# Patient Record
Sex: FEMALE | Race: WHITE | Employment: OTHER | URBAN - NONMETROPOLITAN AREA
[De-identification: names, ages, dates, MRNs, and addresses within clinical notes are randomized per-mention and may not be internally consistent; named-entity substitution may affect disease eponyms.]

---

## 2018-01-24 ENCOUNTER — DOCUMENTATION ONLY (OUTPATIENT)
Dept: FAMILY MEDICINE | Facility: OTHER | Age: 83
End: 2018-01-24

## 2018-01-24 RX ORDER — CARVEDILOL 25 MG/1
12.5 TABLET ORAL 2 TIMES DAILY
COMMUNITY

## 2018-01-24 RX ORDER — ASPIRIN 81 MG/1
81 TABLET ORAL
COMMUNITY

## 2018-01-24 RX ORDER — DILTIAZEM HYDROCHLORIDE 120 MG/1
120 CAPSULE, EXTENDED RELEASE ORAL DAILY
COMMUNITY

## 2018-01-24 RX ORDER — SERTRALINE HYDROCHLORIDE 25 MG/1
50 TABLET, FILM COATED ORAL DAILY
COMMUNITY

## 2018-01-24 RX ORDER — MECLIZINE HYDROCHLORIDE 25 MG/1
25 TABLET ORAL 3 TIMES DAILY PRN
COMMUNITY

## 2018-01-24 RX ORDER — ERGOCALCIFEROL 1.25 MG/1
50000 CAPSULE, LIQUID FILLED ORAL
COMMUNITY

## 2018-01-24 RX ORDER — CLOPIDOGREL BISULFATE 75 MG/1
75 TABLET ORAL DAILY
COMMUNITY

## 2018-01-24 RX ORDER — LISINOPRIL 10 MG/1
10 TABLET ORAL DAILY
COMMUNITY

## 2018-01-24 RX ORDER — ZINC SULFATE 50(220)MG
220 CAPSULE ORAL DAILY
COMMUNITY

## 2018-08-15 ENCOUNTER — NURSING HOME VISIT (OUTPATIENT)
Dept: GERIATRICS | Facility: OTHER | Age: 83
End: 2018-08-15
Attending: NURSE PRACTITIONER
Payer: MEDICARE

## 2018-08-15 DIAGNOSIS — I48.19 PERSISTENT ATRIAL FIBRILLATION (H): Primary | ICD-10-CM

## 2018-08-15 DIAGNOSIS — R06.02 SHORTNESS OF BREATH: ICD-10-CM

## 2018-08-15 PROCEDURE — 99310 SBSQ NF CARE HIGH MDM 45: CPT | Performed by: NURSE PRACTITIONER

## 2018-08-15 NOTE — MR AVS SNAPSHOT
"              After Visit Summary   8/15/2018    Janneth Liu    MRN: [id number]           Patient Information     Date Of Birth          1921        Visit Information        Provider Department      8/15/2018 3:45 AM Opal Carson APRN CNP [facility]        Today's Diagnoses     Persistent atrial fibrillation (H)    -  1    Shortness of breath           Follow-ups after your visit        Who to contact     If you have questions or need follow up information about today's clinic visit or your schedule please contact [facility] directly at [phone].  Normal or non-critical lab and imaging results will be communicated to you by BO.LThart, letter or phone within 4 business days after the clinic has received the results. If you do not hear from us within 7 days, please contact the clinic through BO.LThart or phone. If you have a critical or abnormal lab result, we will notify you by phone as soon as possible.  Submit refill requests through [employer] or call your pharmacy and they will forward the refill request to us. Please allow 3 business days for your refill to be completed.          Additional Information About Your Visit        MyChart Information     [employer] lets you send messages to your doctor, view your test results, renew your prescriptions, schedule appointments and more. To sign up, go to [email or web address] . Click on \"Log in\" on the left side of the screen, which will take you to the Welcome page. Then click on \"Sign up Now\" on the right side of the page.     You will be asked to enter the access code listed below, as well as some personal information. Please follow the directions to create your username and password.     Your access code is: [email or web address]  Expires: 2018 10:57 AM     Your access code will  in 90 days. If you need help or a new code, please call your [place] clinic or [phone].        Care EveryWhere ID     This is your " Care EveryWhere ID. This could be used by other organizations to access your [facility] medical records  [id number]         Blood Pressure from Last 3 Encounters:   No data found for BP    Weight from Last 3 Encounters:   No data found for Wt              Today, you had the following     No orders found for display       Primary Care Provider    None Specified       No primary provider on file.        Equal Access to Services     [facility] : Hadii aad ku hadasho Soomaali, waaxda luqadaha, qaybta kaalmada ademarcianoda, anup mon marilinalberto presley lonraina titus . So [facility] [phone].    ATENCIÓN: Si habla español, tiene a parry disposición servicios gratuitos de asistencia lingüística. Llame al [phone].    We comply with applicable federal civil rights laws and [place] laws. We do not discriminate on the basis of race, color, national origin, age, disability, sex, sexual orientation, or gender identity.            Thank you!     Thank you for choosing [facility] AND [facility]  for your care. Our goal is always to provide you with excellent care. Hearing back from our patients is one way we can continue to improve our services. Please take a few minutes to complete the written survey that you may receive in the mail after your visit with us. Thank you!             Your Updated Medication List - Protect others around you: Learn how to safely use, store and throw away your medicines at www.disposemymeds.org.          This list is accurate as of 8/15/18 11:59 PM.  Always use your most recent med list.                   Brand Name Dispense Instructions for use Diagnosis    aspirin 81 MG EC tablet      Take 81 mg by mouth        carvedilol 25 MG tablet    COREG     Take 12.5 mg by mouth 2 times daily        clopidogrel 75 MG tablet    PLAVIX     Take 75 mg by mouth daily        diltiazem 120 MG Cp12 12 hr SR capsule    CARDIZEM SR     Take 120 mg by mouth daily        lisinopril 10 MG tablet    PRINIVIL/ZESTRIL      Take 10 mg by mouth daily        meclizine 25 MG tablet    ANTIVERT     Take 25 mg by mouth 3 times daily as needed        sertraline 25 MG tablet    ZOLOFT     Take 50 mg by mouth daily        vitamin D 97749 UNIT capsule    ERGOCALCIFEROL     Take 50,000 Units by mouth every 28 days        zinc sulfate 220 (50 Zn) MG capsule    ZINCATE     Take 220 mg by mouth daily

## 2018-08-16 NOTE — PROGRESS NOTES
[facility]  Acute Care Visit    Patient Name: Janneth Liu   : 1921  MRN: [id number]    Place of Service: [facility]  DOS: 8/15/2018    CC: short of breath, tachycardia, chest pain    HPI:  Janneth Liu is a 89 year old female with PMH of multiple chronic medical concerns, who is seen today regarding nursing staff reporting she was going to therapy and with little exertion was starting to feel short of breath and chest pain. Oxygen saturation was in the 80's. Put on oxygen up to 3 liters and now up to 95%. Pt is new admit to [facility]. She had previously been living at home with her son and was brought to ED  in [place] for increased weakness, fast breathing, unable to stand. Pt currently being treated with Augmentin for UTI but also question pneumonia.   At the time of my assessment, nursing staff had given her a nitroglycerin x 1 and chest pain gone. She is complaining of left side pain and son reports she fell about a month ago and has been complaining of pain there ever since.   Wants to lie down. Refuses to eat or drink.   Hx of being on coumadin and this was stopped due to falls. Hx of intracranial hemorrhage as well.   Pt is clear she does not want hospital and is ready to die. Talked with son about goals of care and he understands--hospice consult and comfort measures are what is important to her and he would also like to take her home for her last days as she wished.         Multidisciplinary notes, laboratory values, medications, vital signs, weight and orders arereviewed from nursing home records. I have reviewed the patient s medical history and updated the computerized patient record.     PMH:  No past medical history on file.    Medications:  Current Outpatient Prescriptions   Medication     aspirin EC 81 MG EC tablet     carvedilol (COREG) 25 MG tablet     clopidogrel (PLAVIX) 75 MG tablet     diltiazem (CARDIZEM SR) 120 MG CP12 12 hr SR capsule     lisinopril  "(PRINIVIL/ZESTRIL) 10 MG tablet     meclizine (ANTIVERT) 25 MG tablet     sertraline (ZOLOFT) 25 MG tablet     vitamin D (ERGOCALCIFEROL) 89991 UNIT capsule     zinc sulfate (ZINCATE) 220 (50 ZN) MG capsule     No current facility-administered medications for this visit.        Medication reconciliation complete between Epic record and [facility].     Allergies:  Allergies   Allergen Reactions     Amiodarone Unknown     Brimonidine Unknown     Erythromycin Unknown     Latanoprost Unknown     Simvastatin Unknown     Timolol Unknown       Review of Systems:  \"I feel like I got run over by a truck.\"   Reports left sided rib cage pain-worse with deep breath  Denies chest pain  Keeps asking why she is here. Doesn't know where she is supposed to be.   Denies feeling short of breath at this time-better with oxygen on      Vital Signs:  Temp 96.7   Pulse 118   Respirations 20   /82  Oxygen Sats 95% on 3 Liters nasal cannula     Physical Exam:     Pleasant and alert without distress. Affect confused/forgetful. Clear speech but repeats some.   Skin color pink. Mucous membranes dry.   Lungs very dim bases-almost absent breath sounds. Upper lobes with clear air exchange. Mild exp wheezing.   Cardiovascular irregular, tachycardia. No murmur noted.  Abdomen soft and without masses, tenderness and organomegaly.   Extremities without edema.         Assessment/Plan:  (I48.1) Persistent atrial fibrillation (H)  (primary encounter diagnosis)  Comment: rapid ventricular rate  Plan: Pt has not been wanting to eat or drink, likely dehydrated and causing tachycardia. Currently taking coreg BID. Discussed with son slow progression or decline in patients health and he agrees to hospice consult. His goal was to take her home with hospice.     (R06.02) Shortness of breath  Comment: acute  Plan: Likely short of breath due to tachycardia from rapid afib response due to dehydration.   Having some discomfort on side when takes a deep breath due " to a fall she had about a month ago.   Morphine and ativan prn ordered. Hospice consult.         A total of 45 minutes was spent with this patient, of which more than 50% was spent in counseling and/or coordination of care.     SABINA Almonte ....................  8/16/2018   4:53 PM